# Patient Record
Sex: FEMALE | Race: BLACK OR AFRICAN AMERICAN | NOT HISPANIC OR LATINO
[De-identification: names, ages, dates, MRNs, and addresses within clinical notes are randomized per-mention and may not be internally consistent; named-entity substitution may affect disease eponyms.]

---

## 2017-01-03 ENCOUNTER — MESSAGE (OUTPATIENT)
Age: 37
End: 2017-01-03

## 2017-01-09 ENCOUNTER — APPOINTMENT (OUTPATIENT)
Dept: MATERNAL FETAL MEDICINE | Facility: CLINIC | Age: 37
End: 2017-01-09

## 2017-01-09 ENCOUNTER — ASOB RESULT (OUTPATIENT)
Age: 37
End: 2017-01-09

## 2017-01-09 DIAGNOSIS — O99.810 ABNORMAL GLUCOSE COMPLICATING PREGNANCY: ICD-10-CM

## 2017-01-09 RX ORDER — PEN NEEDLE, DIABETIC 29 G X1/2"
32G X 4 MM NEEDLE, DISPOSABLE MISCELLANEOUS
Qty: 2 | Refills: 5 | Status: ACTIVE | COMMUNITY
Start: 2017-01-09 | End: 1900-01-01

## 2017-01-17 ENCOUNTER — APPOINTMENT (OUTPATIENT)
Dept: ANTEPARTUM | Facility: CLINIC | Age: 37
End: 2017-01-17

## 2017-01-17 ENCOUNTER — APPOINTMENT (OUTPATIENT)
Dept: MATERNAL FETAL MEDICINE | Facility: CLINIC | Age: 37
End: 2017-01-17

## 2017-01-17 ENCOUNTER — ASOB RESULT (OUTPATIENT)
Age: 37
End: 2017-01-17

## 2017-01-27 ENCOUNTER — MESSAGE (OUTPATIENT)
Age: 37
End: 2017-01-27

## 2017-01-31 ENCOUNTER — APPOINTMENT (OUTPATIENT)
Dept: MATERNAL FETAL MEDICINE | Facility: CLINIC | Age: 37
End: 2017-01-31

## 2017-01-31 ENCOUNTER — ASOB RESULT (OUTPATIENT)
Age: 37
End: 2017-01-31

## 2017-01-31 ENCOUNTER — APPOINTMENT (OUTPATIENT)
Dept: ANTEPARTUM | Facility: CLINIC | Age: 37
End: 2017-01-31

## 2017-02-10 ENCOUNTER — MESSAGE (OUTPATIENT)
Age: 37
End: 2017-02-10

## 2017-02-13 ENCOUNTER — ASOB RESULT (OUTPATIENT)
Age: 37
End: 2017-02-13

## 2017-02-13 ENCOUNTER — APPOINTMENT (OUTPATIENT)
Dept: ANTEPARTUM | Facility: CLINIC | Age: 37
End: 2017-02-13

## 2017-02-13 ENCOUNTER — APPOINTMENT (OUTPATIENT)
Dept: MATERNAL FETAL MEDICINE | Facility: CLINIC | Age: 37
End: 2017-02-13

## 2017-02-14 ENCOUNTER — APPOINTMENT (OUTPATIENT)
Dept: ANTEPARTUM | Facility: CLINIC | Age: 37
End: 2017-02-14

## 2017-02-28 ENCOUNTER — APPOINTMENT (OUTPATIENT)
Dept: ANTEPARTUM | Facility: CLINIC | Age: 37
End: 2017-02-28

## 2017-02-28 ENCOUNTER — APPOINTMENT (OUTPATIENT)
Dept: MATERNAL FETAL MEDICINE | Facility: CLINIC | Age: 37
End: 2017-02-28

## 2017-02-28 ENCOUNTER — ASOB RESULT (OUTPATIENT)
Age: 37
End: 2017-02-28

## 2017-03-06 ENCOUNTER — MESSAGE (OUTPATIENT)
Age: 37
End: 2017-03-06

## 2017-03-06 ENCOUNTER — OUTPATIENT (OUTPATIENT)
Dept: OUTPATIENT SERVICES | Age: 37
LOS: 1 days | Discharge: HOME | End: 2017-03-06

## 2017-03-08 ENCOUNTER — APPOINTMENT (OUTPATIENT)
Dept: PEDIATRIC CARDIOLOGY | Facility: CLINIC | Age: 37
End: 2017-03-08

## 2017-03-21 ENCOUNTER — ASOB RESULT (OUTPATIENT)
Age: 37
End: 2017-03-21

## 2017-03-21 ENCOUNTER — APPOINTMENT (OUTPATIENT)
Dept: MATERNAL FETAL MEDICINE | Facility: CLINIC | Age: 37
End: 2017-03-21

## 2017-03-21 ENCOUNTER — APPOINTMENT (OUTPATIENT)
Dept: ANTEPARTUM | Facility: CLINIC | Age: 37
End: 2017-03-21

## 2017-04-11 ENCOUNTER — ASOB RESULT (OUTPATIENT)
Age: 37
End: 2017-04-11

## 2017-04-11 ENCOUNTER — APPOINTMENT (OUTPATIENT)
Dept: ANTEPARTUM | Facility: CLINIC | Age: 37
End: 2017-04-11

## 2017-04-11 ENCOUNTER — APPOINTMENT (OUTPATIENT)
Dept: MATERNAL FETAL MEDICINE | Facility: CLINIC | Age: 37
End: 2017-04-11

## 2017-04-13 ENCOUNTER — OUTPATIENT (OUTPATIENT)
Dept: OUTPATIENT SERVICES | Facility: HOSPITAL | Age: 37
LOS: 1 days | End: 2017-04-13
Payer: COMMERCIAL

## 2017-04-13 DIAGNOSIS — O26.899 OTHER SPECIFIED PREGNANCY RELATED CONDITIONS, UNSPECIFIED TRIMESTER: ICD-10-CM

## 2017-04-13 DIAGNOSIS — Z3A.00 WEEKS OF GESTATION OF PREGNANCY NOT SPECIFIED: ICD-10-CM

## 2017-04-13 LAB
ALBUMIN SERPL ELPH-MCNC: 3.3 G/DL — SIGNIFICANT CHANGE UP (ref 3.3–5)
ALP SERPL-CCNC: 84 U/L — SIGNIFICANT CHANGE UP (ref 40–120)
ALT FLD-CCNC: 25 U/L — SIGNIFICANT CHANGE UP (ref 4–33)
AMYLASE P1 CFR SERPL: 76 U/L — SIGNIFICANT CHANGE UP (ref 25–125)
APPEARANCE UR: CLEAR — SIGNIFICANT CHANGE UP
AST SERPL-CCNC: 17 U/L — SIGNIFICANT CHANGE UP (ref 4–32)
BACTERIA # UR AUTO: SIGNIFICANT CHANGE UP
BASOPHILS # BLD AUTO: 0.01 K/UL — SIGNIFICANT CHANGE UP (ref 0–0.2)
BASOPHILS NFR BLD AUTO: 0.1 % — SIGNIFICANT CHANGE UP (ref 0–2)
BILIRUB SERPL-MCNC: 0.3 MG/DL — SIGNIFICANT CHANGE UP (ref 0.2–1.2)
BILIRUB UR-MCNC: NEGATIVE — SIGNIFICANT CHANGE UP
BLOOD UR QL VISUAL: NEGATIVE — SIGNIFICANT CHANGE UP
BUN SERPL-MCNC: 11 MG/DL — SIGNIFICANT CHANGE UP (ref 7–23)
CALCIUM SERPL-MCNC: 9.1 MG/DL — SIGNIFICANT CHANGE UP (ref 8.4–10.5)
CHLORIDE SERPL-SCNC: 102 MMOL/L — SIGNIFICANT CHANGE UP (ref 98–107)
CO2 SERPL-SCNC: 20 MMOL/L — LOW (ref 22–31)
COLOR SPEC: YELLOW — SIGNIFICANT CHANGE UP
CREAT SERPL-MCNC: 0.57 MG/DL — SIGNIFICANT CHANGE UP (ref 0.5–1.3)
EOSINOPHIL # BLD AUTO: 0.15 K/UL — SIGNIFICANT CHANGE UP (ref 0–0.5)
EOSINOPHIL NFR BLD AUTO: 2 % — SIGNIFICANT CHANGE UP (ref 0–6)
GLUCOSE SERPL-MCNC: 101 MG/DL — HIGH (ref 70–99)
GLUCOSE UR-MCNC: NEGATIVE — SIGNIFICANT CHANGE UP
HCT VFR BLD CALC: 31.4 % — LOW (ref 34.5–45)
HGB BLD-MCNC: 10.2 G/DL — LOW (ref 11.5–15.5)
IMM GRANULOCYTES NFR BLD AUTO: 0.5 % — SIGNIFICANT CHANGE UP (ref 0–1.5)
KETONES UR-MCNC: SIGNIFICANT CHANGE UP
LEUKOCYTE ESTERASE UR-ACNC: NEGATIVE — SIGNIFICANT CHANGE UP
LIDOCAIN IGE QN: 38.4 U/L — SIGNIFICANT CHANGE UP (ref 7–60)
LYMPHOCYTES # BLD AUTO: 0.77 K/UL — LOW (ref 1–3.3)
LYMPHOCYTES # BLD AUTO: 10.1 % — LOW (ref 13–44)
MCHC RBC-ENTMCNC: 26.7 PG — LOW (ref 27–34)
MCHC RBC-ENTMCNC: 32.5 % — SIGNIFICANT CHANGE UP (ref 32–36)
MCV RBC AUTO: 82.2 FL — SIGNIFICANT CHANGE UP (ref 80–100)
MONOCYTES # BLD AUTO: 0.46 K/UL — SIGNIFICANT CHANGE UP (ref 0–0.9)
MONOCYTES NFR BLD AUTO: 6.1 % — SIGNIFICANT CHANGE UP (ref 2–14)
MUCOUS THREADS # UR AUTO: SIGNIFICANT CHANGE UP
NEUTROPHILS # BLD AUTO: 6.16 K/UL — SIGNIFICANT CHANGE UP (ref 1.8–7.4)
NEUTROPHILS NFR BLD AUTO: 81.2 % — HIGH (ref 43–77)
NITRITE UR-MCNC: NEGATIVE — SIGNIFICANT CHANGE UP
PH UR: 6 — SIGNIFICANT CHANGE UP (ref 4.6–8)
PLATELET # BLD AUTO: 234 K/UL — SIGNIFICANT CHANGE UP (ref 150–400)
PMV BLD: 11 FL — SIGNIFICANT CHANGE UP (ref 7–13)
POTASSIUM SERPL-MCNC: 3.8 MMOL/L — SIGNIFICANT CHANGE UP (ref 3.5–5.3)
POTASSIUM SERPL-SCNC: 3.8 MMOL/L — SIGNIFICANT CHANGE UP (ref 3.5–5.3)
PROT SERPL-MCNC: 7.1 G/DL — SIGNIFICANT CHANGE UP (ref 6–8.3)
PROT UR-MCNC: 30 — HIGH
RBC # BLD: 3.82 M/UL — SIGNIFICANT CHANGE UP (ref 3.8–5.2)
RBC # FLD: 14.6 % — HIGH (ref 10.3–14.5)
RBC CASTS # UR COMP ASSIST: SIGNIFICANT CHANGE UP (ref 0–?)
SODIUM SERPL-SCNC: 137 MMOL/L — SIGNIFICANT CHANGE UP (ref 135–145)
SP GR SPEC: 1.03 — SIGNIFICANT CHANGE UP (ref 1–1.03)
SQUAMOUS # UR AUTO: SIGNIFICANT CHANGE UP
UROBILINOGEN FLD QL: NORMAL E.U. — SIGNIFICANT CHANGE UP (ref 0.1–0.2)
WBC # BLD: 7.59 K/UL — SIGNIFICANT CHANGE UP (ref 3.8–10.5)
WBC # FLD AUTO: 7.59 K/UL — SIGNIFICANT CHANGE UP (ref 3.8–10.5)
WBC UR QL: SIGNIFICANT CHANGE UP (ref 0–?)

## 2017-04-13 PROCEDURE — 76818 FETAL BIOPHYS PROFILE W/NST: CPT | Mod: 26

## 2017-04-13 PROCEDURE — 99214 OFFICE O/P EST MOD 30 MIN: CPT | Mod: 25

## 2017-04-13 PROCEDURE — 76817 TRANSVAGINAL US OBSTETRIC: CPT | Mod: 26

## 2017-04-13 RX ORDER — METOCLOPRAMIDE HCL 10 MG
10 TABLET ORAL ONCE
Qty: 0 | Refills: 0 | Status: COMPLETED | OUTPATIENT
Start: 2017-04-13 | End: 2017-04-13

## 2017-04-13 RX ORDER — SODIUM CHLORIDE 9 MG/ML
1000 INJECTION, SOLUTION INTRAVENOUS
Qty: 0 | Refills: 0 | Status: DISCONTINUED | OUTPATIENT
Start: 2017-04-13 | End: 2017-04-28

## 2017-04-13 RX ORDER — SODIUM CHLORIDE 9 MG/ML
1000 INJECTION, SOLUTION INTRAVENOUS ONCE
Qty: 0 | Refills: 0 | Status: COMPLETED | OUTPATIENT
Start: 2017-04-13 | End: 2017-04-13

## 2017-04-13 RX ORDER — METOCLOPRAMIDE HCL 10 MG
1 TABLET ORAL
Qty: 4 | Refills: 0 | OUTPATIENT
Start: 2017-04-13 | End: 2017-04-14

## 2017-04-13 RX ADMIN — Medication 10 MILLIGRAM(S): at 08:27

## 2017-04-13 RX ADMIN — SODIUM CHLORIDE 2000 MILLILITER(S): 9 INJECTION, SOLUTION INTRAVENOUS at 08:27

## 2017-05-01 ENCOUNTER — APPOINTMENT (OUTPATIENT)
Dept: MATERNAL FETAL MEDICINE | Facility: CLINIC | Age: 37
End: 2017-05-01

## 2017-05-01 ENCOUNTER — APPOINTMENT (OUTPATIENT)
Dept: ANTEPARTUM | Facility: CLINIC | Age: 37
End: 2017-05-01

## 2017-05-01 ENCOUNTER — ASOB RESULT (OUTPATIENT)
Age: 37
End: 2017-05-01

## 2017-05-02 ENCOUNTER — MESSAGE (OUTPATIENT)
Age: 37
End: 2017-05-02

## 2017-05-02 RX ORDER — METFORMIN ER 500 MG 500 MG/1
500 TABLET ORAL
Qty: 1 | Refills: 2 | Status: ACTIVE | COMMUNITY
Start: 2017-01-31 | End: 1900-01-01

## 2017-05-15 ENCOUNTER — ASOB RESULT (OUTPATIENT)
Age: 37
End: 2017-05-15

## 2017-05-15 ENCOUNTER — APPOINTMENT (OUTPATIENT)
Dept: ANTEPARTUM | Facility: HOSPITAL | Age: 37
End: 2017-05-15

## 2017-05-15 ENCOUNTER — APPOINTMENT (OUTPATIENT)
Dept: ANTEPARTUM | Facility: CLINIC | Age: 37
End: 2017-05-15

## 2017-05-15 ENCOUNTER — OUTPATIENT (OUTPATIENT)
Dept: OUTPATIENT SERVICES | Facility: HOSPITAL | Age: 37
LOS: 1 days | End: 2017-05-15

## 2017-05-16 DIAGNOSIS — O34.211 MATERNAL CARE FOR LOW TRANSVERSE SCAR FROM PREVIOUS CESAREAN DELIVERY: ICD-10-CM

## 2017-05-16 DIAGNOSIS — O34.29 MATERNAL CARE DUE TO UTERINE SCAR FROM OTHER PREVIOUS SURGERY: ICD-10-CM

## 2017-05-16 DIAGNOSIS — O09.293 SUPERVISION OF PREGNANCY WITH OTHER POOR REPRODUCTIVE OR OBSTETRIC HISTORY, THIRD TRIMESTER: ICD-10-CM

## 2017-05-16 DIAGNOSIS — O99.213 OBESITY COMPLICATING PREGNANCY, THIRD TRIMESTER: ICD-10-CM

## 2017-05-16 DIAGNOSIS — O24.414 GESTATIONAL DIABETES MELLITUS IN PREGNANCY, INSULIN CONTROLLED: ICD-10-CM

## 2017-05-16 DIAGNOSIS — O99.013 ANEMIA COMPLICATING PREGNANCY, THIRD TRIMESTER: ICD-10-CM

## 2017-05-16 DIAGNOSIS — Z3A.32 32 WEEKS GESTATION OF PREGNANCY: ICD-10-CM

## 2017-05-23 ENCOUNTER — OUTPATIENT (OUTPATIENT)
Dept: OUTPATIENT SERVICES | Facility: HOSPITAL | Age: 37
LOS: 1 days | End: 2017-05-23

## 2017-05-23 ENCOUNTER — APPOINTMENT (OUTPATIENT)
Dept: ANTEPARTUM | Facility: HOSPITAL | Age: 37
End: 2017-05-23

## 2017-05-23 ENCOUNTER — APPOINTMENT (OUTPATIENT)
Dept: ANTEPARTUM | Facility: CLINIC | Age: 37
End: 2017-05-23

## 2017-05-23 ENCOUNTER — ASOB RESULT (OUTPATIENT)
Age: 37
End: 2017-05-23

## 2017-05-24 ENCOUNTER — MESSAGE (OUTPATIENT)
Age: 37
End: 2017-05-24

## 2017-05-25 DIAGNOSIS — Z3A.33 33 WEEKS GESTATION OF PREGNANCY: ICD-10-CM

## 2017-05-25 DIAGNOSIS — O99.013 ANEMIA COMPLICATING PREGNANCY, THIRD TRIMESTER: ICD-10-CM

## 2017-05-25 DIAGNOSIS — O09.293 SUPERVISION OF PREGNANCY WITH OTHER POOR REPRODUCTIVE OR OBSTETRIC HISTORY, THIRD TRIMESTER: ICD-10-CM

## 2017-05-25 DIAGNOSIS — O24.414 GESTATIONAL DIABETES MELLITUS IN PREGNANCY, INSULIN CONTROLLED: ICD-10-CM

## 2017-05-25 DIAGNOSIS — O34.211 MATERNAL CARE FOR LOW TRANSVERSE SCAR FROM PREVIOUS CESAREAN DELIVERY: ICD-10-CM

## 2017-05-25 DIAGNOSIS — O34.29 MATERNAL CARE DUE TO UTERINE SCAR FROM OTHER PREVIOUS SURGERY: ICD-10-CM

## 2017-05-25 DIAGNOSIS — O99.213 OBESITY COMPLICATING PREGNANCY, THIRD TRIMESTER: ICD-10-CM

## 2017-05-25 RX ORDER — INSULIN DETEMIR 100 [IU]/ML
100 INJECTION, SOLUTION SUBCUTANEOUS
Qty: 2 | Refills: 5 | Status: ACTIVE | COMMUNITY
Start: 2017-01-09 | End: 1900-01-01

## 2017-05-26 ENCOUNTER — MESSAGE (OUTPATIENT)
Age: 37
End: 2017-05-26

## 2017-05-30 ENCOUNTER — APPOINTMENT (OUTPATIENT)
Dept: ANTEPARTUM | Facility: CLINIC | Age: 37
End: 2017-05-30

## 2017-05-30 ENCOUNTER — ASOB RESULT (OUTPATIENT)
Age: 37
End: 2017-05-30

## 2017-05-30 ENCOUNTER — APPOINTMENT (OUTPATIENT)
Dept: ANTEPARTUM | Facility: HOSPITAL | Age: 37
End: 2017-05-30

## 2017-05-30 ENCOUNTER — OUTPATIENT (OUTPATIENT)
Dept: OUTPATIENT SERVICES | Facility: HOSPITAL | Age: 37
LOS: 1 days | End: 2017-05-30

## 2017-05-30 ENCOUNTER — APPOINTMENT (OUTPATIENT)
Dept: MATERNAL FETAL MEDICINE | Facility: CLINIC | Age: 37
End: 2017-05-30

## 2017-05-31 DIAGNOSIS — O99.013 ANEMIA COMPLICATING PREGNANCY, THIRD TRIMESTER: ICD-10-CM

## 2017-05-31 DIAGNOSIS — O09.293 SUPERVISION OF PREGNANCY WITH OTHER POOR REPRODUCTIVE OR OBSTETRIC HISTORY, THIRD TRIMESTER: ICD-10-CM

## 2017-05-31 DIAGNOSIS — O24.414 GESTATIONAL DIABETES MELLITUS IN PREGNANCY, INSULIN CONTROLLED: ICD-10-CM

## 2017-05-31 DIAGNOSIS — O34.29 MATERNAL CARE DUE TO UTERINE SCAR FROM OTHER PREVIOUS SURGERY: ICD-10-CM

## 2017-06-06 ENCOUNTER — OUTPATIENT (OUTPATIENT)
Dept: OUTPATIENT SERVICES | Facility: HOSPITAL | Age: 37
LOS: 1 days | End: 2017-06-06

## 2017-06-06 ENCOUNTER — APPOINTMENT (OUTPATIENT)
Dept: ANTEPARTUM | Facility: HOSPITAL | Age: 37
End: 2017-06-06

## 2017-06-06 ENCOUNTER — ASOB RESULT (OUTPATIENT)
Age: 37
End: 2017-06-06

## 2017-06-06 ENCOUNTER — APPOINTMENT (OUTPATIENT)
Dept: ANTEPARTUM | Facility: CLINIC | Age: 37
End: 2017-06-06

## 2017-06-07 ENCOUNTER — OUTPATIENT (OUTPATIENT)
Dept: OUTPATIENT SERVICES | Facility: HOSPITAL | Age: 37
LOS: 1 days | End: 2017-06-07

## 2017-06-07 DIAGNOSIS — O34.29 MATERNAL CARE DUE TO UTERINE SCAR FROM OTHER PREVIOUS SURGERY: ICD-10-CM

## 2017-06-07 DIAGNOSIS — Z64.1 PROBLEMS RELATED TO MULTIPARITY: ICD-10-CM

## 2017-06-07 DIAGNOSIS — O34.211 MATERNAL CARE FOR LOW TRANSVERSE SCAR FROM PREVIOUS CESAREAN DELIVERY: ICD-10-CM

## 2017-06-07 DIAGNOSIS — Z3A.35 35 WEEKS GESTATION OF PREGNANCY: ICD-10-CM

## 2017-06-07 DIAGNOSIS — O09.293 SUPERVISION OF PREGNANCY WITH OTHER POOR REPRODUCTIVE OR OBSTETRIC HISTORY, THIRD TRIMESTER: ICD-10-CM

## 2017-06-07 DIAGNOSIS — O24.414 GESTATIONAL DIABETES MELLITUS IN PREGNANCY, INSULIN CONTROLLED: ICD-10-CM

## 2017-06-07 DIAGNOSIS — O99.213 OBESITY COMPLICATING PREGNANCY, THIRD TRIMESTER: ICD-10-CM

## 2017-06-07 DIAGNOSIS — O99.013 ANEMIA COMPLICATING PREGNANCY, THIRD TRIMESTER: ICD-10-CM

## 2017-06-07 LAB
APPEARANCE UR: CLEAR — SIGNIFICANT CHANGE UP
BACTERIA # UR AUTO: SIGNIFICANT CHANGE UP
BILIRUB UR-MCNC: NEGATIVE — SIGNIFICANT CHANGE UP
BLD GP AB SCN SERPL QL: NEGATIVE — SIGNIFICANT CHANGE UP
BLOOD UR QL VISUAL: NEGATIVE — SIGNIFICANT CHANGE UP
BUN SERPL-MCNC: 10 MG/DL — SIGNIFICANT CHANGE UP (ref 7–23)
CALCIUM SERPL-MCNC: 8.7 MG/DL — SIGNIFICANT CHANGE UP (ref 8.4–10.5)
CHLORIDE SERPL-SCNC: 103 MMOL/L — SIGNIFICANT CHANGE UP (ref 98–107)
CO2 SERPL-SCNC: 18 MMOL/L — LOW (ref 22–31)
COLOR SPEC: YELLOW — SIGNIFICANT CHANGE UP
CREAT SERPL-MCNC: 0.62 MG/DL — SIGNIFICANT CHANGE UP (ref 0.5–1.3)
GLUCOSE SERPL-MCNC: 110 MG/DL — HIGH (ref 70–99)
GLUCOSE UR-MCNC: NEGATIVE — SIGNIFICANT CHANGE UP
HCT VFR BLD CALC: 28.9 % — LOW (ref 34.5–45)
HGB BLD-MCNC: 9 G/DL — LOW (ref 11.5–15.5)
KETONES UR-MCNC: SIGNIFICANT CHANGE UP
LEUKOCYTE ESTERASE UR-ACNC: NEGATIVE — SIGNIFICANT CHANGE UP
MCHC RBC-ENTMCNC: 24.6 PG — LOW (ref 27–34)
MCHC RBC-ENTMCNC: 31.1 % — LOW (ref 32–36)
MCV RBC AUTO: 79 FL — LOW (ref 80–100)
MUCOUS THREADS # UR AUTO: SIGNIFICANT CHANGE UP
NITRITE UR-MCNC: NEGATIVE — SIGNIFICANT CHANGE UP
NON-SQ EPI CELLS # UR AUTO: <1 — SIGNIFICANT CHANGE UP
PH UR: 6.5 — SIGNIFICANT CHANGE UP (ref 4.6–8)
PLATELET # BLD AUTO: 219 K/UL — SIGNIFICANT CHANGE UP (ref 150–400)
PMV BLD: 11 FL — SIGNIFICANT CHANGE UP (ref 7–13)
POTASSIUM SERPL-MCNC: 3.5 MMOL/L — SIGNIFICANT CHANGE UP (ref 3.5–5.3)
POTASSIUM SERPL-SCNC: 3.5 MMOL/L — SIGNIFICANT CHANGE UP (ref 3.5–5.3)
PROT UR-MCNC: 20 — SIGNIFICANT CHANGE UP
RBC # BLD: 3.66 M/UL — LOW (ref 3.8–5.2)
RBC # FLD: 15.7 % — HIGH (ref 10.3–14.5)
RBC CASTS # UR COMP ASSIST: SIGNIFICANT CHANGE UP (ref 0–?)
RH IG SCN BLD-IMP: POSITIVE — SIGNIFICANT CHANGE UP
SODIUM SERPL-SCNC: 138 MMOL/L — SIGNIFICANT CHANGE UP (ref 135–145)
SP GR SPEC: 1.02 — SIGNIFICANT CHANGE UP (ref 1–1.03)
SQUAMOUS # UR AUTO: SIGNIFICANT CHANGE UP
UROBILINOGEN FLD QL: NORMAL E.U. — SIGNIFICANT CHANGE UP (ref 0.1–0.2)
WBC # BLD: 6.79 K/UL — SIGNIFICANT CHANGE UP (ref 3.8–10.5)
WBC # FLD AUTO: 6.79 K/UL — SIGNIFICANT CHANGE UP (ref 3.8–10.5)
WBC UR QL: SIGNIFICANT CHANGE UP (ref 0–?)

## 2017-06-08 LAB — T PALLIDUM AB TITR SER: NEGATIVE — SIGNIFICANT CHANGE UP

## 2017-06-13 ENCOUNTER — ASOB RESULT (OUTPATIENT)
Age: 37
End: 2017-06-13

## 2017-06-13 ENCOUNTER — APPOINTMENT (OUTPATIENT)
Dept: ANTEPARTUM | Facility: CLINIC | Age: 37
End: 2017-06-13

## 2017-06-13 ENCOUNTER — APPOINTMENT (OUTPATIENT)
Dept: MATERNAL FETAL MEDICINE | Facility: CLINIC | Age: 37
End: 2017-06-13

## 2017-06-13 ENCOUNTER — OUTPATIENT (OUTPATIENT)
Dept: OUTPATIENT SERVICES | Facility: HOSPITAL | Age: 37
LOS: 1 days | End: 2017-06-13

## 2017-06-16 ENCOUNTER — APPOINTMENT (OUTPATIENT)
Dept: ANTEPARTUM | Facility: HOSPITAL | Age: 37
End: 2017-06-16

## 2017-06-20 ENCOUNTER — TRANSCRIPTION ENCOUNTER (OUTPATIENT)
Age: 37
End: 2017-06-20

## 2017-06-20 ENCOUNTER — RESULT REVIEW (OUTPATIENT)
Age: 37
End: 2017-06-20

## 2017-06-20 ENCOUNTER — INPATIENT (INPATIENT)
Facility: HOSPITAL | Age: 37
LOS: 2 days | Discharge: HOME | End: 2017-06-23
Attending: OBSTETRICS & GYNECOLOGY | Admitting: OBSTETRICS & GYNECOLOGY
Payer: COMMERCIAL

## 2017-06-20 VITALS — HEIGHT: 68 IN | WEIGHT: 293 LBS

## 2017-06-20 DIAGNOSIS — Z64.1 PROBLEMS RELATED TO MULTIPARITY: ICD-10-CM

## 2017-06-20 LAB
BASE EXCESS BLDV CALC-SCNC: -3.8 MMOL/L — SIGNIFICANT CHANGE UP
BASOPHILS # BLD AUTO: 0.02 K/UL — SIGNIFICANT CHANGE UP (ref 0–0.2)
BASOPHILS NFR BLD AUTO: 0.2 % — SIGNIFICANT CHANGE UP (ref 0–2)
BLD GP AB SCN SERPL QL: NEGATIVE — SIGNIFICANT CHANGE UP
EOSINOPHIL # BLD AUTO: 0.01 K/UL — SIGNIFICANT CHANGE UP (ref 0–0.5)
EOSINOPHIL NFR BLD AUTO: 0.1 % — SIGNIFICANT CHANGE UP (ref 0–6)
GAS PNL BLDV: 137 MMOL/L — SIGNIFICANT CHANGE UP (ref 136–146)
GLUCOSE BLDV-MCNC: 88 — SIGNIFICANT CHANGE UP (ref 70–99)
HCO3 BLDV-SCNC: 21 MMOL/L — SIGNIFICANT CHANGE UP (ref 20–27)
HCT VFR BLD CALC: 31.7 % — LOW (ref 34.5–45)
HCT VFR BLDV CALC: 27.5 % — LOW (ref 34.5–45)
HGB BLD-MCNC: 10.3 G/DL — LOW (ref 11.5–15.5)
HGB BLDV-MCNC: 8.9 G/DL — LOW (ref 11.5–15.5)
IMM GRANULOCYTES NFR BLD AUTO: 0.5 % — SIGNIFICANT CHANGE UP (ref 0–1.5)
LYMPHOCYTES # BLD AUTO: 1.14 K/UL — SIGNIFICANT CHANGE UP (ref 1–3.3)
LYMPHOCYTES # BLD AUTO: 10.4 % — LOW (ref 13–44)
MCHC RBC-ENTMCNC: 25.8 PG — LOW (ref 27–34)
MCHC RBC-ENTMCNC: 32.5 % — SIGNIFICANT CHANGE UP (ref 32–36)
MCV RBC AUTO: 79.4 FL — LOW (ref 80–100)
MONOCYTES # BLD AUTO: 0.77 K/UL — SIGNIFICANT CHANGE UP (ref 0–0.9)
MONOCYTES NFR BLD AUTO: 7 % — SIGNIFICANT CHANGE UP (ref 2–14)
NEUTROPHILS # BLD AUTO: 8.95 K/UL — HIGH (ref 1.8–7.4)
NEUTROPHILS NFR BLD AUTO: 81.8 % — HIGH (ref 43–77)
PCO2 BLDV: 36 MMHG — LOW (ref 41–51)
PH BLDV: 7.38 PH — SIGNIFICANT CHANGE UP (ref 7.32–7.43)
PLATELET # BLD AUTO: 182 K/UL — SIGNIFICANT CHANGE UP (ref 150–400)
PMV BLD: 10.7 FL — SIGNIFICANT CHANGE UP (ref 7–13)
PO2 BLDV: 72 MMHG — HIGH (ref 35–40)
POTASSIUM BLDV-SCNC: 3.5 MMOL/L — SIGNIFICANT CHANGE UP (ref 3.4–4.5)
RBC # BLD: 3.99 M/UL — SIGNIFICANT CHANGE UP (ref 3.8–5.2)
RBC # FLD: 16.1 % — HIGH (ref 10.3–14.5)
RH IG SCN BLD-IMP: POSITIVE — SIGNIFICANT CHANGE UP
SAO2 % BLDV: 93.6 % — HIGH (ref 60–85)
WBC # BLD: 10.94 K/UL — HIGH (ref 3.8–10.5)
WBC # FLD AUTO: 10.94 K/UL — HIGH (ref 3.8–10.5)

## 2017-06-20 PROCEDURE — 88302 TISSUE EXAM BY PATHOLOGIST: CPT | Mod: 26

## 2017-06-20 RX ORDER — SODIUM CHLORIDE 9 MG/ML
1000 INJECTION, SOLUTION INTRAVENOUS
Qty: 0 | Refills: 0 | Status: DISCONTINUED | OUTPATIENT
Start: 2017-06-20 | End: 2017-06-21

## 2017-06-20 RX ORDER — LANOLIN
1 OINTMENT (GRAM) TOPICAL
Qty: 0 | Refills: 0 | Status: DISCONTINUED | OUTPATIENT
Start: 2017-06-20 | End: 2017-06-23

## 2017-06-20 RX ORDER — GLYCERIN ADULT
1 SUPPOSITORY, RECTAL RECTAL AT BEDTIME
Qty: 0 | Refills: 0 | Status: DISCONTINUED | OUTPATIENT
Start: 2017-06-20 | End: 2017-06-23

## 2017-06-20 RX ORDER — DOCUSATE SODIUM 100 MG
100 CAPSULE ORAL
Qty: 0 | Refills: 0 | Status: DISCONTINUED | OUTPATIENT
Start: 2017-06-20 | End: 2017-06-21

## 2017-06-20 RX ORDER — FERROUS SULFATE 325(65) MG
325 TABLET ORAL DAILY
Qty: 0 | Refills: 0 | Status: DISCONTINUED | OUTPATIENT
Start: 2017-06-20 | End: 2017-06-21

## 2017-06-20 RX ORDER — ACETAMINOPHEN 500 MG
650 TABLET ORAL EVERY 6 HOURS
Qty: 0 | Refills: 0 | Status: DISCONTINUED | OUTPATIENT
Start: 2017-06-20 | End: 2017-06-21

## 2017-06-20 RX ORDER — METOCLOPRAMIDE HCL 10 MG
10 TABLET ORAL ONCE
Qty: 0 | Refills: 0 | Status: COMPLETED | OUTPATIENT
Start: 2017-06-20 | End: 2017-06-20

## 2017-06-20 RX ORDER — HYDROMORPHONE HYDROCHLORIDE 2 MG/ML
1 INJECTION INTRAMUSCULAR; INTRAVENOUS; SUBCUTANEOUS
Qty: 0 | Refills: 0 | Status: DISCONTINUED | OUTPATIENT
Start: 2017-06-21 | End: 2017-06-21

## 2017-06-20 RX ORDER — OXYTOCIN 10 UNIT/ML
41.67 VIAL (ML) INJECTION
Qty: 20 | Refills: 0 | Status: DISCONTINUED | OUTPATIENT
Start: 2017-06-20 | End: 2017-06-21

## 2017-06-20 RX ORDER — IBUPROFEN 200 MG
600 TABLET ORAL EVERY 6 HOURS
Qty: 0 | Refills: 0 | Status: DISCONTINUED | OUTPATIENT
Start: 2017-06-20 | End: 2017-06-23

## 2017-06-20 RX ORDER — OXYCODONE HYDROCHLORIDE 5 MG/1
10 TABLET ORAL
Qty: 0 | Refills: 0 | Status: DISCONTINUED | OUTPATIENT
Start: 2017-06-21 | End: 2017-06-21

## 2017-06-20 RX ORDER — SIMETHICONE 80 MG/1
80 TABLET, CHEWABLE ORAL EVERY 4 HOURS
Qty: 0 | Refills: 0 | Status: DISCONTINUED | OUTPATIENT
Start: 2017-06-20 | End: 2017-06-23

## 2017-06-20 RX ORDER — CITRIC ACID/SODIUM CITRATE 300-500 MG
30 SOLUTION, ORAL ORAL ONCE
Qty: 0 | Refills: 0 | Status: COMPLETED | OUTPATIENT
Start: 2017-06-20 | End: 2017-06-20

## 2017-06-20 RX ORDER — DIPHENHYDRAMINE HCL 50 MG
25 CAPSULE ORAL EVERY 6 HOURS
Qty: 0 | Refills: 0 | Status: DISCONTINUED | OUTPATIENT
Start: 2017-06-20 | End: 2017-06-23

## 2017-06-20 RX ORDER — OXYCODONE HYDROCHLORIDE 5 MG/1
5 TABLET ORAL
Qty: 0 | Refills: 0 | Status: DISCONTINUED | OUTPATIENT
Start: 2017-06-20 | End: 2017-06-21

## 2017-06-20 RX ORDER — SODIUM CHLORIDE 9 MG/ML
1000 INJECTION, SOLUTION INTRAVENOUS
Qty: 0 | Refills: 0 | Status: DISCONTINUED | OUTPATIENT
Start: 2017-06-20 | End: 2017-06-20

## 2017-06-20 RX ORDER — TETANUS TOXOID, REDUCED DIPHTHERIA TOXOID AND ACELLULAR PERTUSSIS VACCINE, ADSORBED 5; 2.5; 8; 8; 2.5 [IU]/.5ML; [IU]/.5ML; UG/.5ML; UG/.5ML; UG/.5ML
0.5 SUSPENSION INTRAMUSCULAR ONCE
Qty: 0 | Refills: 0 | Status: DISCONTINUED | OUTPATIENT
Start: 2017-06-21 | End: 2017-06-23

## 2017-06-20 RX ORDER — CEFAZOLIN SODIUM 1 G
3000 VIAL (EA) INJECTION ONCE
Qty: 0 | Refills: 0 | Status: COMPLETED | OUTPATIENT
Start: 2017-06-20 | End: 2017-06-20

## 2017-06-20 RX ORDER — BUTORPHANOL TARTRATE 2 MG/ML
0.12 INJECTION, SOLUTION INTRAMUSCULAR; INTRAVENOUS EVERY 6 HOURS
Qty: 0 | Refills: 0 | Status: DISCONTINUED | OUTPATIENT
Start: 2017-06-21 | End: 2017-06-21

## 2017-06-20 RX ORDER — ACETAMINOPHEN 500 MG
975 TABLET ORAL EVERY 6 HOURS
Qty: 0 | Refills: 0 | Status: DISCONTINUED | OUTPATIENT
Start: 2017-06-21 | End: 2017-06-23

## 2017-06-20 RX ORDER — ONDANSETRON 8 MG/1
4 TABLET, FILM COATED ORAL EVERY 6 HOURS
Qty: 0 | Refills: 0 | Status: DISCONTINUED | OUTPATIENT
Start: 2017-06-20 | End: 2017-06-21

## 2017-06-20 RX ORDER — HEPARIN SODIUM 5000 [USP'U]/ML
10000 INJECTION INTRAVENOUS; SUBCUTANEOUS EVERY 12 HOURS
Qty: 0 | Refills: 0 | Status: DISCONTINUED | OUTPATIENT
Start: 2017-06-20 | End: 2017-06-23

## 2017-06-20 RX ORDER — KETOROLAC TROMETHAMINE 30 MG/ML
30 SYRINGE (ML) INJECTION EVERY 6 HOURS
Qty: 0 | Refills: 0 | Status: DISCONTINUED | OUTPATIENT
Start: 2017-06-20 | End: 2017-06-20

## 2017-06-20 RX ORDER — FAMOTIDINE 10 MG/ML
20 INJECTION INTRAVENOUS ONCE
Qty: 0 | Refills: 0 | Status: COMPLETED | OUTPATIENT
Start: 2017-06-20 | End: 2017-06-20

## 2017-06-20 RX ORDER — SODIUM CHLORIDE 9 MG/ML
1000 INJECTION, SOLUTION INTRAVENOUS ONCE
Qty: 0 | Refills: 0 | Status: COMPLETED | OUTPATIENT
Start: 2017-06-20 | End: 2017-06-20

## 2017-06-20 RX ORDER — HYDROMORPHONE HYDROCHLORIDE 2 MG/ML
1 INJECTION INTRAMUSCULAR; INTRAVENOUS; SUBCUTANEOUS
Qty: 0 | Refills: 0 | Status: DISCONTINUED | OUTPATIENT
Start: 2017-06-20 | End: 2017-06-20

## 2017-06-20 RX ADMIN — OXYCODONE HYDROCHLORIDE 5 MILLIGRAM(S): 5 TABLET ORAL at 23:15

## 2017-06-20 RX ADMIN — SIMETHICONE 80 MILLIGRAM(S): 80 TABLET, CHEWABLE ORAL at 22:42

## 2017-06-20 RX ADMIN — OXYCODONE HYDROCHLORIDE 5 MILLIGRAM(S): 5 TABLET ORAL at 22:42

## 2017-06-20 RX ADMIN — HEPARIN SODIUM 10000 UNIT(S): 5000 INJECTION INTRAVENOUS; SUBCUTANEOUS at 15:30

## 2017-06-20 RX ADMIN — Medication 200 MILLIGRAM(S): at 19:34

## 2017-06-20 RX ADMIN — ONDANSETRON 4 MILLIGRAM(S): 8 TABLET, FILM COATED ORAL at 12:14

## 2017-06-20 RX ADMIN — SODIUM CHLORIDE 2000 MILLILITER(S): 9 INJECTION, SOLUTION INTRAVENOUS at 07:35

## 2017-06-20 RX ADMIN — SODIUM CHLORIDE 125 MILLILITER(S): 9 INJECTION, SOLUTION INTRAVENOUS at 07:36

## 2017-06-20 RX ADMIN — FAMOTIDINE 20 MILLIGRAM(S): 10 INJECTION INTRAVENOUS at 07:00

## 2017-06-20 RX ADMIN — Medication 650 MILLIGRAM(S): at 22:42

## 2017-06-20 RX ADMIN — HYDROMORPHONE HYDROCHLORIDE 1 MILLIGRAM(S): 2 INJECTION INTRAMUSCULAR; INTRAVENOUS; SUBCUTANEOUS at 12:37

## 2017-06-20 RX ADMIN — Medication 30 MILLILITER(S): at 07:00

## 2017-06-20 RX ADMIN — Medication 10 MILLIGRAM(S): at 13:39

## 2017-06-20 RX ADMIN — SODIUM CHLORIDE 2000 MILLILITER(S): 9 INJECTION, SOLUTION INTRAVENOUS at 14:20

## 2017-06-20 RX ADMIN — Medication 10 MILLIGRAM(S): at 07:35

## 2017-06-20 RX ADMIN — HYDROMORPHONE HYDROCHLORIDE 1 MILLIGRAM(S): 2 INJECTION INTRAMUSCULAR; INTRAVENOUS; SUBCUTANEOUS at 11:33

## 2017-06-20 RX ADMIN — Medication 975 MILLIGRAM(S): at 21:55

## 2017-06-20 RX ADMIN — Medication 30 MILLIGRAM(S): at 16:00

## 2017-06-20 RX ADMIN — HYDROMORPHONE HYDROCHLORIDE 1 MILLIGRAM(S): 2 INJECTION INTRAMUSCULAR; INTRAVENOUS; SUBCUTANEOUS at 12:14

## 2017-06-20 RX ADMIN — Medication 125 MILLIUNIT(S)/MIN: at 12:03

## 2017-06-20 RX ADMIN — SODIUM CHLORIDE 75 MILLILITER(S): 9 INJECTION, SOLUTION INTRAVENOUS at 13:11

## 2017-06-20 RX ADMIN — HYDROMORPHONE HYDROCHLORIDE 1 MILLIGRAM(S): 2 INJECTION INTRAMUSCULAR; INTRAVENOUS; SUBCUTANEOUS at 11:45

## 2017-06-20 NOTE — PROGRESS NOTE ADULT - SUBJECTIVE AND OBJECTIVE BOX
R2 OB Event Note    Patient evaluated due to low UOP post-operatively.  Patient with no complaints.  Denies HA, lightheadness, dizziness, CP/SOB, palpiations.   Pt evaluated w/Dr. Boswell at bedside. UOP for the hour was 50 which was adequate.    T(C): 36.5, Max: 36.6 (06-20 @ 16:00)  HR: 93 (73 - 110)  BP: 129/76 (109/65 - 147/83)  RR: 18 (14 - 27)  SpO2: 97% (95% - 100%)  Wt(kg): --  I&O's Summary    I & Os for current day (as of 20 Jun 2017 18:44)  =============================================  IN: 3850 ml / OUT: 270 ml / NET: 3580 ml    I&O's Detail    I & Os for current day (as of 20 Jun 2017 18:44)  =============================================  IN:    Lactated Ringers IV Bolus: 3000 ml    oxytocin Infusion: 625 ml    lactated ringers.: 225 ml    Total IN: 3850 ml  ---------------------------------------------  OUT:    Indwelling Catheter - Urethral: 270 ml    Total OUT: 270 ml  ---------------------------------------------  Total NET: 3580 ml      General: patient lying comftorably in bed, NAD   CV: RR S1S2   Lungs: CTA b/l, good air flow b/l   Abd: fundus firm, non-tender, incision c/d/i   Vaginal: mild lochia on pad   Extremeties: non-tender b/l, no edema      A/P 36y POD#0 s/p    LTCS   w/ low UOP post-operatively.    Differential includes intra-abdominal bleeding vs. intravascular depletion.  Low concern for intra-abdominal bleeding given normal vitals, scan and benign exam.     -Increase maintanence to  200  -Continue to monitor VS and UOP  -Patinet to remain in PACU until UOP is adequate    ZANDER Villalba, PGY2

## 2017-06-21 LAB
HCT VFR BLD CALC: 27 % — LOW (ref 34.5–45)
HCT VFR BLD CALC: 27.1 % — LOW (ref 34.5–45)
HGB BLD-MCNC: 8.6 G/DL — LOW (ref 11.5–15.5)
HGB BLD-MCNC: 8.9 G/DL — LOW (ref 11.5–15.5)
MCHC RBC-ENTMCNC: 24.9 PG — LOW (ref 27–34)
MCHC RBC-ENTMCNC: 26 PG — LOW (ref 27–34)
MCHC RBC-ENTMCNC: 31.7 % — LOW (ref 32–36)
MCHC RBC-ENTMCNC: 33 % — SIGNIFICANT CHANGE UP (ref 32–36)
MCV RBC AUTO: 78.6 FL — LOW (ref 80–100)
MCV RBC AUTO: 78.9 FL — LOW (ref 80–100)
PLATELET # BLD AUTO: 201 K/UL — SIGNIFICANT CHANGE UP (ref 150–400)
PLATELET # BLD AUTO: 218 K/UL — SIGNIFICANT CHANGE UP (ref 150–400)
PMV BLD: 11.3 FL — SIGNIFICANT CHANGE UP (ref 7–13)
PMV BLD: 11.3 FL — SIGNIFICANT CHANGE UP (ref 7–13)
RBC # BLD: 3.42 M/UL — LOW (ref 3.8–5.2)
RBC # BLD: 3.45 M/UL — LOW (ref 3.8–5.2)
RBC # FLD: 16.3 % — HIGH (ref 10.3–14.5)
RBC # FLD: 16.4 % — HIGH (ref 10.3–14.5)
WBC # BLD: 10.27 K/UL — SIGNIFICANT CHANGE UP (ref 3.8–10.5)
WBC # BLD: 9.67 K/UL — SIGNIFICANT CHANGE UP (ref 3.8–10.5)
WBC # FLD AUTO: 10.27 K/UL — SIGNIFICANT CHANGE UP (ref 3.8–10.5)
WBC # FLD AUTO: 9.67 K/UL — SIGNIFICANT CHANGE UP (ref 3.8–10.5)

## 2017-06-21 RX ORDER — OXYCODONE HYDROCHLORIDE 5 MG/1
5 TABLET ORAL EVERY 4 HOURS
Qty: 0 | Refills: 0 | Status: DISCONTINUED | OUTPATIENT
Start: 2017-06-21 | End: 2017-06-23

## 2017-06-21 RX ORDER — KETOROLAC TROMETHAMINE 30 MG/ML
30 SYRINGE (ML) INJECTION EVERY 6 HOURS
Qty: 0 | Refills: 0 | Status: DISCONTINUED | OUTPATIENT
Start: 2017-06-21 | End: 2017-06-21

## 2017-06-21 RX ORDER — DOCUSATE SODIUM 100 MG
100 CAPSULE ORAL
Qty: 0 | Refills: 0 | Status: DISCONTINUED | OUTPATIENT
Start: 2017-06-21 | End: 2017-06-23

## 2017-06-21 RX ORDER — ASCORBIC ACID 60 MG
500 TABLET,CHEWABLE ORAL DAILY
Qty: 0 | Refills: 0 | Status: DISCONTINUED | OUTPATIENT
Start: 2017-06-21 | End: 2017-06-23

## 2017-06-21 RX ORDER — OXYCODONE HYDROCHLORIDE 5 MG/1
5 TABLET ORAL
Qty: 0 | Refills: 0 | Status: DISCONTINUED | OUTPATIENT
Start: 2017-06-21 | End: 2017-06-23

## 2017-06-21 RX ORDER — FERROUS SULFATE 325(65) MG
325 TABLET ORAL
Qty: 0 | Refills: 0 | Status: DISCONTINUED | OUTPATIENT
Start: 2017-06-21 | End: 2017-06-23

## 2017-06-21 RX ADMIN — Medication 975 MILLIGRAM(S): at 10:20

## 2017-06-21 RX ADMIN — Medication 30 MILLIGRAM(S): at 10:10

## 2017-06-21 RX ADMIN — Medication 325 MILLIGRAM(S): at 18:10

## 2017-06-21 RX ADMIN — Medication 325 MILLIGRAM(S): at 09:51

## 2017-06-21 RX ADMIN — OXYCODONE HYDROCHLORIDE 5 MILLIGRAM(S): 5 TABLET ORAL at 18:30

## 2017-06-21 RX ADMIN — Medication 30 MILLIGRAM(S): at 09:49

## 2017-06-21 RX ADMIN — OXYCODONE HYDROCHLORIDE 5 MILLIGRAM(S): 5 TABLET ORAL at 23:12

## 2017-06-21 RX ADMIN — OXYCODONE HYDROCHLORIDE 5 MILLIGRAM(S): 5 TABLET ORAL at 14:36

## 2017-06-21 RX ADMIN — SIMETHICONE 80 MILLIGRAM(S): 80 TABLET, CHEWABLE ORAL at 09:50

## 2017-06-21 RX ADMIN — HEPARIN SODIUM 10000 UNIT(S): 5000 INJECTION INTRAVENOUS; SUBCUTANEOUS at 04:13

## 2017-06-21 RX ADMIN — OXYCODONE HYDROCHLORIDE 5 MILLIGRAM(S): 5 TABLET ORAL at 22:12

## 2017-06-21 RX ADMIN — Medication 975 MILLIGRAM(S): at 05:05

## 2017-06-21 RX ADMIN — Medication 1 TABLET(S): at 09:51

## 2017-06-21 RX ADMIN — OXYCODONE HYDROCHLORIDE 5 MILLIGRAM(S): 5 TABLET ORAL at 18:10

## 2017-06-21 RX ADMIN — OXYCODONE HYDROCHLORIDE 5 MILLIGRAM(S): 5 TABLET ORAL at 14:06

## 2017-06-21 RX ADMIN — OXYCODONE HYDROCHLORIDE 5 MILLIGRAM(S): 5 TABLET ORAL at 04:31

## 2017-06-21 RX ADMIN — Medication 600 MILLIGRAM(S): at 16:00

## 2017-06-21 RX ADMIN — Medication 600 MILLIGRAM(S): at 15:25

## 2017-06-21 RX ADMIN — Medication 975 MILLIGRAM(S): at 20:55

## 2017-06-21 RX ADMIN — Medication 975 MILLIGRAM(S): at 15:27

## 2017-06-21 RX ADMIN — Medication 975 MILLIGRAM(S): at 04:31

## 2017-06-21 RX ADMIN — Medication 975 MILLIGRAM(S): at 16:00

## 2017-06-21 RX ADMIN — Medication 600 MILLIGRAM(S): at 20:55

## 2017-06-21 RX ADMIN — SIMETHICONE 80 MILLIGRAM(S): 80 TABLET, CHEWABLE ORAL at 20:55

## 2017-06-21 RX ADMIN — Medication 975 MILLIGRAM(S): at 09:50

## 2017-06-21 RX ADMIN — SIMETHICONE 80 MILLIGRAM(S): 80 TABLET, CHEWABLE ORAL at 15:35

## 2017-06-21 RX ADMIN — Medication 100 MILLIGRAM(S): at 09:51

## 2017-06-21 RX ADMIN — Medication 600 MILLIGRAM(S): at 21:55

## 2017-06-21 RX ADMIN — OXYCODONE HYDROCHLORIDE 5 MILLIGRAM(S): 5 TABLET ORAL at 05:05

## 2017-06-21 RX ADMIN — SIMETHICONE 80 MILLIGRAM(S): 80 TABLET, CHEWABLE ORAL at 04:31

## 2017-06-21 RX ADMIN — HEPARIN SODIUM 10000 UNIT(S): 5000 INJECTION INTRAVENOUS; SUBCUTANEOUS at 15:25

## 2017-06-21 NOTE — PROGRESS NOTE ADULT - SUBJECTIVE AND OBJECTIVE BOX
Patient seen and examined at bedside, no acute overnight events. No acute complaints, pain well controlled.  Patient is ambulating and tolerating regular diet. Has passed flatus. García is still in place. Pt denies light headedness, SOB, palpitations.    Vital Signs Last 24 Hours  T(C): 37.1, Max: 37.1 (06-21 @ 01:37)  HR: 99 (73 - 110)  BP: 113/53 (109/65 - 147/83)  RR: 18 (14 - 27)  SpO2: 95% (95% - 100%)  Wt(kg): --    I&O's Summary    I & Os for current day (as of 21 Jun 2017 07:36)  =============================================  IN: 3850 ml / OUT: 1720 ml / NET: 2130 ml      Physical Exam:  General: NAD  Abdomen: Soft, non-tender, non-distended, fundus firm  Incision: Pfannenstiel incision CDI, subcuticular suture closure  Pelvic: Lochia wnl    Labs:    Blood Type: A Positive  RPR: Negative               10.3   10.94 )-----------( 182      ( 06-20 @ 15:38 )             31.7         MEDICATIONS  (STANDING):  acetaminophen   Tablet. 975milliGRAM(s) Oral every 6 hours  lactated ringers. 1000milliLiter(s) IV Continuous <Continuous>  ibuprofen  Tablet 600milliGRAM(s) Oral every 6 hours  diphtheria/tetanus/pertussis (acellular) Vaccine (ADAcel) 0.5milliLiter(s) IntraMuscular once  ferrous    sulfate 325milliGRAM(s) Oral daily  prenatal multivitamin 1Tablet(s) Oral daily  heparin  Injectable 58163Boko(s) SubCutaneous every 12 hours    MEDICATIONS  (PRN):  simethicone 80milliGRAM(s) Chew every 4 hours PRN Gas  diphenhydrAMINE   Capsule 25milliGRAM(s) Oral every 6 hours PRN Itching  glycerin Suppository - Adult 1Suppository(s) Rectal at bedtime PRN Constipation  docusate sodium 100milliGRAM(s) Oral two times a day PRN Stool Softening  lanolin Ointment 1Application(s) Topical every 3 hours PRN Sore Nipples  oxyCODONE IR 5milliGRAM(s) Oral every 3 hours PRN Mild Pain  oxyCODONE IR 10milliGRAM(s) Oral every 3 hours PRN Moderate Pain  HYDROmorphone  Injectable 1milliGRAM(s) IV Push every 3 hours PRN Severe Pain  ondansetron Injectable 4milliGRAM(s) IV Push every 6 hours PRN Nausea  butorphanol Injectable 0.125milliGRAM(s) IV Push every 6 hours PRN Pruritus

## 2017-06-21 NOTE — PROGRESS NOTE ADULT - SUBJECTIVE AND OBJECTIVE BOX
S: Patient reports of inadequate pain control to RN    O: Vital Signs Last 24 Hrs  T(C): 37.1, Max: 37.1 (06-21 @ 01:37)  T(F): 98.8, Max: 98.8 (06-21 @ 01:37)  HR: 99 (73 - 110)  BP: 113/53 (109/65 - 147/83)  BP(mean): 86 (69 - 98)  RR: 18 (14 - 27)  SpO2: 95% (95% - 100%)    Labs:                        10.3   10.94 )-----------( 182      ( 20 Jun 2017 15:38 )             31.7     Urine output during night shift: 1450 cc  Urine output at this time: 400cc      A: 36y PPD#1 s/p C/S complicated by EBL 2500cc    Plan: Continue routine postpartum care.  - patient cleared to start Toradol given VSS and urine output is adequate, patient meets both criteria.

## 2017-06-21 NOTE — LACTATION INITIAL EVALUATION - INTERVENTION OUTCOME
verbalizes understanding/demonstrates understanding of teaching/reviewed wet and soiled diaper log, feeding cues, feeding on demand;  encouraged to ask for assistance with breastfeeding as needed/good return demonstration

## 2017-06-21 NOTE — PROGRESS NOTE ADULT - SUBJECTIVE AND OBJECTIVE BOX
Postop Day  __1_ s/p   C- Section    THERAPY:  [X] Spinal morphine   [  ] Epidural morphine   [  ] IV PCA Hydromorphone 1 mg/ml      Sedation Score:	  [ X ] Alert	    [  ] Drowsy        [  ] Arousable	[  ] Asleep	[  ] Unresponsive    Side Effects:	  [ X ] None	     [  ] Nausea        [  ] Pruritus        [  ] Weakness   [  ] Numbness        ASSESSMENT/ PLAN   [ X ] Discontinue         [  ] Continue  [ x ]Documentation and Verification of current medications     Comments: May use oral opioids or adjuvant medication PRN for pain at this point.

## 2017-06-21 NOTE — PROGRESS NOTE ADULT - PROBLEM SELECTOR PLAN 1
- Continue with po analgesia  - Increase ambulation  - Continue regular diet  - d/c IV fluids  - Check CBC  - D/c Raquel

## 2017-06-21 NOTE — LACTATION INITIAL EVALUATION - LACTATION INTERVENTIONS
assisted pt. to achieve deep latch on left breast in football hold position; recommended placing rolled up receiving blanket for support to pendulous breasts;  baby noted to suck with stimulation/initiate skin to skin

## 2017-06-21 NOTE — PROGRESS NOTE ADULT - SUBJECTIVE AND OBJECTIVE BOX
ANESTHESIA POSTOP CHECK    36y Female POSTOP DAY 1 S/P     [ X] NO APPARENT ANESTHESIA COMPLICATIONS      Comments:

## 2017-06-21 NOTE — PROGRESS NOTE ADULT - ASSESSMENT
37y/o  POD#1 from rLTCS+BTL in stable condition. Current issues include EBL 2500 c/b adhesions, s/p 2uPRBCs, s/p Ancef x2 35y/o  POD#1 from rLTCS+BTL for hx of myomectomy in stable condition. Current issues include EBL 2500 c/b adhesions, s/p 2uPRBCs, s/p Ancef x2

## 2017-06-22 RX ADMIN — OXYCODONE HYDROCHLORIDE 5 MILLIGRAM(S): 5 TABLET ORAL at 05:00

## 2017-06-22 RX ADMIN — Medication 600 MILLIGRAM(S): at 10:38

## 2017-06-22 RX ADMIN — SIMETHICONE 80 MILLIGRAM(S): 80 TABLET, CHEWABLE ORAL at 04:03

## 2017-06-22 RX ADMIN — Medication 975 MILLIGRAM(S): at 11:07

## 2017-06-22 RX ADMIN — OXYCODONE HYDROCHLORIDE 5 MILLIGRAM(S): 5 TABLET ORAL at 12:45

## 2017-06-22 RX ADMIN — OXYCODONE HYDROCHLORIDE 5 MILLIGRAM(S): 5 TABLET ORAL at 16:15

## 2017-06-22 RX ADMIN — OXYCODONE HYDROCHLORIDE 5 MILLIGRAM(S): 5 TABLET ORAL at 23:37

## 2017-06-22 RX ADMIN — Medication 600 MILLIGRAM(S): at 11:07

## 2017-06-22 RX ADMIN — HEPARIN SODIUM 10000 UNIT(S): 5000 INJECTION INTRAVENOUS; SUBCUTANEOUS at 16:11

## 2017-06-22 RX ADMIN — OXYCODONE HYDROCHLORIDE 5 MILLIGRAM(S): 5 TABLET ORAL at 10:37

## 2017-06-22 RX ADMIN — Medication 975 MILLIGRAM(S): at 10:37

## 2017-06-22 RX ADMIN — Medication 975 MILLIGRAM(S): at 17:10

## 2017-06-22 RX ADMIN — Medication 1 TABLET(S): at 12:18

## 2017-06-22 RX ADMIN — SIMETHICONE 80 MILLIGRAM(S): 80 TABLET, CHEWABLE ORAL at 23:37

## 2017-06-22 RX ADMIN — OXYCODONE HYDROCHLORIDE 5 MILLIGRAM(S): 5 TABLET ORAL at 04:03

## 2017-06-22 RX ADMIN — Medication 975 MILLIGRAM(S): at 16:15

## 2017-06-22 RX ADMIN — Medication 600 MILLIGRAM(S): at 17:10

## 2017-06-22 RX ADMIN — Medication 325 MILLIGRAM(S): at 16:17

## 2017-06-22 RX ADMIN — Medication 600 MILLIGRAM(S): at 16:14

## 2017-06-22 RX ADMIN — Medication 325 MILLIGRAM(S): at 10:37

## 2017-06-22 RX ADMIN — OXYCODONE HYDROCHLORIDE 5 MILLIGRAM(S): 5 TABLET ORAL at 11:07

## 2017-06-22 RX ADMIN — HEPARIN SODIUM 10000 UNIT(S): 5000 INJECTION INTRAVENOUS; SUBCUTANEOUS at 04:12

## 2017-06-22 RX ADMIN — Medication 500 MILLIGRAM(S): at 12:18

## 2017-06-22 RX ADMIN — Medication 975 MILLIGRAM(S): at 23:37

## 2017-06-22 RX ADMIN — Medication 325 MILLIGRAM(S): at 12:18

## 2017-06-22 RX ADMIN — OXYCODONE HYDROCHLORIDE 5 MILLIGRAM(S): 5 TABLET ORAL at 17:10

## 2017-06-22 RX ADMIN — Medication 100 MILLIGRAM(S): at 18:33

## 2017-06-22 RX ADMIN — Medication 600 MILLIGRAM(S): at 23:37

## 2017-06-22 RX ADMIN — OXYCODONE HYDROCHLORIDE 5 MILLIGRAM(S): 5 TABLET ORAL at 12:18

## 2017-06-22 NOTE — PROGRESS NOTE ADULT - SUBJECTIVE AND OBJECTIVE BOX
OB Postpartum Note: Repeat  Delivery, POD#2    S: 35yo  POD#2 s/p rLTCS. Her pain is well controlled. She is tolerating a regular diet and passing flatus. Voiding spontaneously. Denies N/V. Denies CP/SOB/lightheadedness/dizziness.     O:   Vitals:  Vital Signs Last 24 Hrs  T(C): 36.6, Max: 37.1 (- @ 10:21)  T(F): 97.9, Max: 98.8 (- @ 10:21)  HR: 100 (100 - 103)  BP: 113/76 (109/60 - 113/76)  BP(mean): --  RR: 17 (17 - 18)  SpO2: 98% (98% - 99%)    MEDICATIONS  (STANDING):  acetaminophen   Tablet. 975milliGRAM(s) Oral every 6 hours  ibuprofen  Tablet 600milliGRAM(s) Oral every 6 hours  diphtheria/tetanus/pertussis (acellular) Vaccine (ADAcel) 0.5milliLiter(s) IntraMuscular once  prenatal multivitamin 1Tablet(s) Oral daily  heparin  Injectable 26431Txki(s) SubCutaneous every 12 hours  oxyCODONE IR 5milliGRAM(s) Oral every 3 hours  ferrous    sulfate 325milliGRAM(s) Oral three times a day with meals  docusate sodium 100milliGRAM(s) Oral two times a day  ascorbic acid 500milliGRAM(s) Oral daily    MEDICATIONS  (PRN):  simethicone 80milliGRAM(s) Chew every 4 hours PRN Gas  diphenhydrAMINE   Capsule 25milliGRAM(s) Oral every 6 hours PRN Itching  glycerin Suppository - Adult 1Suppository(s) Rectal at bedtime PRN Constipation  lanolin Ointment 1Application(s) Topical every 3 hours PRN Sore Nipples  oxyCODONE IR 5milliGRAM(s) Oral every 4 hours PRN Severe Pain (7 - 10)      Labs:  Blood type: A Positive  Rubella IgG: RPR: Negative                          8.9<L>   10.27 >-----------< 218    (  @ 14:14 )             27.0<L>                        8.6<L>   9.67 >-----------< 201    (  @ 06:00 )             27.1<L>                        10.3<L>   10.94<H> >-----------< 182    (  @ 15:38 )             31.7<L>                  PE:  General: NAD  Abdomen: mildly distended,appropriately tender, incision c/d/i.  Extremities: SCDs in place, no erythema

## 2017-06-22 NOTE — PROGRESS NOTE ADULT - PROBLEM SELECTOR PLAN 1
- Continue regular diet.  - Increase ambulation.  - Continue motrin, tylenol, oxycodone PRN for pain control.  ZANDER Villalba, PGY2

## 2017-06-22 NOTE — PROGRESS NOTE ADULT - SUBJECTIVE AND OBJECTIVE BOX
Postpartum Note,  Section  She is a  36y woman who is now post-operative day: 2 s/p 2 units of PRBC for anemia of blood loss     Subjective:  The patient feels well.  She is ambulating.   She is tolerating regular diet.  She denies nausea and vomiting.  She is voiding.  Her pain is controlled.  She reports normal postpartum bleeding.  She is breastfeeding.  She is formula feeding.    Physical exam:    Vital Signs Last 24 Hrs  T(C): 36.9, Max: 36.9 (- @ 14:03)  T(F): 98.4, Max: 98.4 (- @ 14:03)  HR: 73 (73 - 100)  BP: 111/63 (109/60 - 113/76)  BP(mean): --  RR: 18 (17 - 18)  SpO2: 98% (98% - 98%)    Gen: NAD  Breast: Soft, nontender, not engorged.  Abdomen: Soft, nontender, no distension , firm uterine fundus at umbilicus.  Incision: Clean, dry, and intact  Pelvic: Normal lochia noted  Ext: No calf tenderness    LABS:                        8.9    10.27 )-----------( 218      ( 2017 14:14 )             27.0       Rubella status:     Allergies    No Known Allergies    Intolerances      MEDICATIONS  (STANDING):  acetaminophen   Tablet. 975milliGRAM(s) Oral every 6 hours  ibuprofen  Tablet 600milliGRAM(s) Oral every 6 hours  diphtheria/tetanus/pertussis (acellular) Vaccine (ADAcel) 0.5milliLiter(s) IntraMuscular once  prenatal multivitamin 1Tablet(s) Oral daily  heparin  Injectable 03039Ljmx(s) SubCutaneous every 12 hours  oxyCODONE IR 5milliGRAM(s) Oral every 3 hours  ferrous    sulfate 325milliGRAM(s) Oral three times a day with meals  docusate sodium 100milliGRAM(s) Oral two times a day  ascorbic acid 500milliGRAM(s) Oral daily    MEDICATIONS  (PRN):  simethicone 80milliGRAM(s) Chew every 4 hours PRN Gas  diphenhydrAMINE   Capsule 25milliGRAM(s) Oral every 6 hours PRN Itching  glycerin Suppository - Adult 1Suppository(s) Rectal at bedtime PRN Constipation  lanolin Ointment 1Application(s) Topical every 3 hours PRN Sore Nipples  oxyCODONE IR 5milliGRAM(s) Oral every 4 hours PRN Severe Pain (7 - 10)        Assessment and Plan  POD # 2 s/p  section  Doing well.  Encourage ambulation.

## 2017-06-23 VITALS
OXYGEN SATURATION: 98 % | TEMPERATURE: 98 F | HEART RATE: 85 BPM | SYSTOLIC BLOOD PRESSURE: 105 MMHG | DIASTOLIC BLOOD PRESSURE: 62 MMHG | RESPIRATION RATE: 18 BRPM

## 2017-06-23 RX ORDER — ACETAMINOPHEN 500 MG
3 TABLET ORAL
Qty: 0 | Refills: 0 | COMMUNITY
Start: 2017-06-23

## 2017-06-23 RX ORDER — IBUPROFEN 200 MG
1 TABLET ORAL
Qty: 0 | Refills: 0 | COMMUNITY
Start: 2017-06-23

## 2017-06-23 RX ADMIN — Medication 600 MILLIGRAM(S): at 00:37

## 2017-06-23 RX ADMIN — Medication 600 MILLIGRAM(S): at 07:21

## 2017-06-23 RX ADMIN — OXYCODONE HYDROCHLORIDE 5 MILLIGRAM(S): 5 TABLET ORAL at 06:21

## 2017-06-23 RX ADMIN — Medication 975 MILLIGRAM(S): at 06:21

## 2017-06-23 RX ADMIN — Medication 975 MILLIGRAM(S): at 00:37

## 2017-06-23 RX ADMIN — Medication 100 MILLIGRAM(S): at 06:21

## 2017-06-23 RX ADMIN — SIMETHICONE 80 MILLIGRAM(S): 80 TABLET, CHEWABLE ORAL at 06:21

## 2017-06-23 RX ADMIN — Medication 975 MILLIGRAM(S): at 07:21

## 2017-06-23 RX ADMIN — Medication 600 MILLIGRAM(S): at 06:22

## 2017-06-23 RX ADMIN — Medication 325 MILLIGRAM(S): at 15:30

## 2017-06-23 RX ADMIN — HEPARIN SODIUM 10000 UNIT(S): 5000 INJECTION INTRAVENOUS; SUBCUTANEOUS at 05:58

## 2017-06-23 RX ADMIN — OXYCODONE HYDROCHLORIDE 5 MILLIGRAM(S): 5 TABLET ORAL at 00:37

## 2017-06-23 NOTE — DISCHARGE NOTE OB - CARE PLAN
Principal Discharge DX:	 delivery delivered  Goal:	recovery  Instructions for follow-up, activity and diet:	return visit x 2 weeks, pelvic rest, regular diet

## 2017-06-23 NOTE — PROGRESS NOTE ADULT - PROBLEM SELECTOR PLAN 1
- Continue regular diet.  - Increase ambulation.  - Continue motrin, tylenol, oxycodone PRN for pain control.  - Discharge planning.    Gurdeep Villalba PGY-2

## 2017-06-23 NOTE — DISCHARGE NOTE OB - CARE PROVIDER_API CALL
Jenelle Tristan (MD), Obstetrics and Gynecology  [address] [place] [address]  Phone: [phone]  Fax: [phone]

## 2017-06-23 NOTE — DISCHARGE NOTE OB - MEDICATION SUMMARY - MEDICATIONS TO TAKE
I will START or STAY ON the medications listed below when I get home from the hospital:    ibuprofen 600 mg oral tablet  -- 1 tab(s) by mouth every 6 hours, As Needed  -- Indication: For as needed for pain    acetaminophen 325 mg oral tablet  -- 3 tab(s) by mouth every 6 hours, As Needed  -- Indication: For as needed for pain    Prenatal Multivitamins with Folic Acid 1 mg oral tablet  -- 1 tab(s) by mouth once a day  -- Indication: For  delivery delivered

## 2017-06-23 NOTE — DISCHARGE NOTE OB - MEDICATION SUMMARY - MEDICATIONS TO STOP TAKING
I will STOP taking the medications listed below when I get home from the hospital:    metoclopramide 10 mg oral tablet  -- 1 tab(s) by mouth every 6 to 8 hours  -- It is very important that you take or use this exactly as directed.  Do not skip doses or discontinue unless directed by your doctor.  May cause drowsiness.  Alcohol may intensify this effect.  Use care when operating dangerous machinery.  Take medication on an empty stomach 1 hour before or 2 to 3 hours after a meal unless otherwise directed by your doctor.

## 2017-06-23 NOTE — PROGRESS NOTE ADULT - ATTENDING COMMENTS
Patient seen and agree with above. Stable for discharge.  SYDNIE Montes Patient seen and agree with above. +edema, no HA, N/V,scotomata, RUQ pain.    vss  Stable for discharge.  Dw patient danger signs preeclampsia  KEY. Jyoti

## 2017-06-23 NOTE — DISCHARGE NOTE OB - PATIENT PORTAL LINK FT
“You can access the [employer] Patient Portal, offered by [facility], by registering with the following website: [email or web address]”

## 2017-06-23 NOTE — PROGRESS NOTE ADULT - SUBJECTIVE AND OBJECTIVE BOX
OB Postpartum Note: Repeat  Delivery, POD#3    S: 35yo  POD#3 s/p rLTCS. Her pain is well controlled. She is tolerating a regular diet and passing flatus. Voiding spontaneously. Denies N/V. Denies CP/SOB/lightheadedness/dizziness.     O:   Vitals:  Vital Signs Last 24 Hrs  T(C): 36.6, Max: 37 (- @ 22:22)  T(F): 97.9, Max: 98.6 (- @ 22:22)  HR: 85 (73 - 99)  BP: 105/62 (105/62 - 122/62)  BP(mean): --  RR: 18 (18 - 19)  SpO2: 98% (98% - 100%)    MEDICATIONS  (STANDING):  acetaminophen   Tablet. 975milliGRAM(s) Oral every 6 hours  ibuprofen  Tablet 600milliGRAM(s) Oral every 6 hours  diphtheria/tetanus/pertussis (acellular) Vaccine (ADAcel) 0.5milliLiter(s) IntraMuscular once  prenatal multivitamin 1Tablet(s) Oral daily  heparin  Injectable 79469Lwcr(s) SubCutaneous every 12 hours  oxyCODONE IR 5milliGRAM(s) Oral every 3 hours  ferrous    sulfate 325milliGRAM(s) Oral three times a day with meals  docusate sodium 100milliGRAM(s) Oral two times a day  ascorbic acid 500milliGRAM(s) Oral daily    MEDICATIONS  (PRN):  simethicone 80milliGRAM(s) Chew every 4 hours PRN Gas  diphenhydrAMINE   Capsule 25milliGRAM(s) Oral every 6 hours PRN Itching  glycerin Suppository - Adult 1Suppository(s) Rectal at bedtime PRN Constipation  lanolin Ointment 1Application(s) Topical every 3 hours PRN Sore Nipples  oxyCODONE IR 5milliGRAM(s) Oral every 4 hours PRN Severe Pain (7 - 10)      Labs:  Blood type: A Positive  Rubella IgG: RPR: Negative                          8.9<L>   10.27 >-----------< 218    (  @ 14:14 )             27.0<L>                        8.6<L>   9.67 >-----------< 201    (  @ 06:00 )             27.1<L>                        10.3<L>   10.94<H> >-----------< 182    (  @ 15:38 )             31.7<L>                  PE:  General: NAD  Abdomen: mildly distended,appropriately tender, incision c/d/i.  Extremities: SCDs in place, no erythema

## 2017-06-27 LAB — SURGICAL PATHOLOGY STUDY: SIGNIFICANT CHANGE UP

## 2017-07-06 DIAGNOSIS — O99.013 ANEMIA COMPLICATING PREGNANCY, THIRD TRIMESTER: ICD-10-CM

## 2017-07-06 DIAGNOSIS — O09.293 SUPERVISION OF PREGNANCY WITH OTHER POOR REPRODUCTIVE OR OBSTETRIC HISTORY, THIRD TRIMESTER: ICD-10-CM

## 2017-07-06 DIAGNOSIS — O24.414 GESTATIONAL DIABETES MELLITUS IN PREGNANCY, INSULIN CONTROLLED: ICD-10-CM

## 2021-02-02 ENCOUNTER — RESULT REVIEW (OUTPATIENT)
Age: 41
End: 2021-02-02

## 2021-02-09 NOTE — LACTATION INITIAL EVALUATION - LATCH: SCORE INFANT
Patient Education        Child's Well Visit, 14 to 15 Months: Care Instructions  Your Care Instructions     Your child is exploring his or her world and may experience many emotions. When parents respond to emotional needs in a loving, consistent way, their children develop confidence and feel more secure. At 14 to 15 months, your child may be able to say a few words, understand simple commands, and let you know what he or she wants by pulling, pointing, or grunting. Your child may drink from a cup and point to parts of his or her body. Your child may walk well and climb stairs. Follow-up care is a key part of your child's treatment and safety. Be sure to make and go to all appointments, and call your doctor if your child is having problems. It's also a good idea to know your child's test results and keep a list of the medicines your child takes. How can you care for your child at home? Safety  · Make sure your child cannot get burned. Keep hot pots, curling irons, irons, and coffee cups out of his or her reach. Put plastic plugs in all electrical sockets. Put in smoke detectors and check the batteries regularly. · For every ride in a car, secure your child into a properly installed car seat that meets all current safety standards. For questions about car seats, call the [employer] at 0-247.540.6490. · Watch your child at all times when he or she is near water, including pools, hot tubs, buckets, bathtubs, and toilets. · Keep cleaning products and medicines in locked cabinets out of your child's reach. Keep the number for Poison Control (9-197.982.5990) near your phone. · Tell your doctor if your child spends a lot of time in a house built before 1978. The paint could have lead in it, which can be harmful. Discipline  · Be patient and be consistent, but do not say \"no\" all the time or have too many rules. It will only confuse your child. · Teach your child how to use words to ask for things. · Set a good example. Do not get angry or yell in front of your child. · If your child is being demanding, try to change his or her attention to something else. Or you can move to a different room so your child has some space to calm down. · If your child does not want to do something, do not get upset. Children often say no at this age. If your child does not want to do something that really needs to be done, like going to day care, gently pick your child up and take him or her to day care. · Be loving, understanding, and consistent to help your child through this part of development. Feeding  · Offer a variety of healthy foods each day, including fruits, well-cooked vegetables, low-sugar cereal, yogurt, whole-grain breads and crackers, lean meat, fish, and tofu. Kids need to eat at least every 3 or 4 hours. · Do not give your child foods that may cause choking, such as nuts, whole grapes, hard or sticky candy, or popcorn. · Give your child healthy snacks. Even if your child does not seem to like them at first, keep trying. Buy snack foods made from wheat, corn, rice, oats, or other grains, such as breads, cereals, tortillas, noodles, crackers, and muffins. Immunizations  · Make sure your baby gets the recommended childhood vaccines. They will help keep your baby healthy and prevent the spread of disease. When should you call for help? Watch closely for changes in your child's health, and be sure to contact your doctor if:    · You are concerned that your child is not growing or developing normally.     · You are worried about your child's behavior.     · You need more information about how to care for your child, or you have questions or concerns. Where can you learn more? 7

## 2022-02-04 ENCOUNTER — RESULT REVIEW (OUTPATIENT)
Age: 42
End: 2022-02-04

## 2022-09-12 ENCOUNTER — APPOINTMENT (OUTPATIENT)
Dept: ORTHOPEDIC SURGERY | Facility: CLINIC | Age: 42
End: 2022-09-12

## 2022-09-12 VITALS — WEIGHT: 270 LBS | BODY MASS INDEX: 40.92 KG/M2 | HEIGHT: 68 IN

## 2022-09-12 DIAGNOSIS — E11.9 TYPE 2 DIABETES MELLITUS W/OUT COMPLICATIONS: ICD-10-CM

## 2022-09-12 PROCEDURE — 73610 X-RAY EXAM OF ANKLE: CPT | Mod: LT

## 2022-09-12 PROCEDURE — 73630 X-RAY EXAM OF FOOT: CPT | Mod: LT

## 2022-09-12 PROCEDURE — 99204 OFFICE O/P NEW MOD 45 MIN: CPT

## 2022-09-12 RX ORDER — MELOXICAM 15 MG/1
15 TABLET ORAL DAILY
Qty: 30 | Refills: 0 | Status: ACTIVE | COMMUNITY
Start: 2022-09-12 | End: 1900-01-01

## 2022-09-12 NOTE — HISTORY OF PRESENT ILLNESS
[Sudden] : sudden [10] : 10 [3] : 3 [Burning] : burning [Dull/Aching] : dull/aching [Localized] : localized [Throbbing] : throbbing [Constant] : constant [Household chores] : household chores [Leisure] : leisure [Work] : work [Sleep] : sleep [Rest] : rest [Meds] : meds [Ice] : ice [Walking] : walking [Exercising] : exercising [Stairs] : stairs [Lying in bed] : lying in bed [Full time] : Work status: full time [de-identified] : 09/12/2022 In June 2022 patient states she was jumping rope on asphalt and she felt pain in her left achilles.  She did have some pain in the area of her Achilles tendon prior to this incident.  Since this incident she has had pain as well as sensitivity in the back of her heel.  She does find herself limping with prolonged walking and standing.  The pain is localized to the back of her heel she denies any calf pain or any shortness of breath.  She denies any numbness or tingling or any fever or chills. [] : no [FreeTextEntry1] : Left Achilles [FreeTextEntry3] : June 2022 [FreeTextEntry9] : Salt soak, elevation

## 2022-09-12 NOTE — IMAGING
[de-identified] : Constitutional: General Appearance: healthy-appearing, NAD, and normal body habitus.\par Psychiatric: Orientation: oriented to time, place, and person. Mood and Affect: normal mood and affect and active and alert.\par \par Skin: Right Lower Extremity: normal. Left Lower Extremity: normal. Inspection and palpation: no rash, lesions, or induration.\par \par Inspection: Hindfoot alignment is in slight valgus. There is mild moderate swelling over the distal distal Achilles tendon and the Achilles insertion. There is no erythema or induration. Able to perform a single-leg heel rise.\par \par Palpation: There is no anterior ankle joint line tenderness. Diffusely tender over the insertion and distal Achilles tendon and over the posterior heel with localized swelling. There is no tenderness over the insertion of the Achilles tendon. There is no palpable defect within the Achilles tendon. There is a down-going Greer test. The resting tension of the Achilles tendon is equal to the contralateral extremity. The calf is soft and nontender. There is no pain over the proximal mid shaft or distal tibia or fibula. The leg\par compartments are soft and nontender. Nontender over the medial or lateral malleolus or over the course of the peroneal or posterior tibial tendons. No instability or laxity on examination of the ankle or subtalar joints. No tenderness over the sinus tarsi. There is no tenderness over the heel or over the plantar fascia.\par On examination of the foot: There is no tenderness to the transverse tarsal joints, TMT joints or over the Lisfranc joint. There is no tenderness over the navicular, cuboid, cuneiforms or metatarsal shafts. No pain over the webspaces. Full painless range of motion through the MTP joints. No pain on examination of the toes.\par \par ROM: Limited dorsiflexion of 5 degrees, plantar flexion 25 degrees, inversion 15 degrees, eversion 10 degrees. There is no pain or discomfort on active range of motion.\par \par Muscle strength: 5/5 strength with resisted dorsiflexion, plantar flexion, inversion and eversion with pain on plantar flexion\par \par Stability: No instability or laxity on varus and valgus stress. Negative anterior drawer test.\par \par Vascular Exam: Arterial Pulses Right and Left: posterior tibialis normal and dorsalis pedis normal. Edema Right and Left: no edema. Varicosities Right and Left: no varicosities and capillary refill test normal.\par \par Neurological System: Sensation on the Left: normal at the lateral plantar nerve, the medial plantar nerve, the superficial peroneal nerve, the deep peroneal nerve, the sural nerve, and the saphenous nerve and normal distal extremities. Sensation: grossly intact.\par \par Radiographs: X-rays done today in the office were 3 views of the ankle and foot weight-bearing with no limitations to today's studies which reveal no acute fractures or dislocations. The ankle mortise and syndesmosis are reduced and well aligned. \par There is a posterior calcaneal osteophyte noted, with a posterior superior Juan's deformity, with significant intrasubstance calcification within the distal Achilles tendon\par

## 2022-09-12 NOTE — ASSESSMENT
[FreeTextEntry1] : After their examination today in the office and review of their radiographs they have developed moderate to severe Achilles tendinitis/tendinosis. Since they are quite painful and unable to bear weight or walk comfortably on the extremity without a significant limp or limitation I would recommend protection and immobilization in a Cam Walker boot in order to relieve all stress and repetitive motion from the inflamed painful and degenerated Achilles tendon. The Cam Walker boot was manipulated to fit them today in the office and they were much more comfortable walking in the Cam Walker boot, and were able to walk in a Cam Walker boot safely. A neutral heel wedge was placed into the foot in order to elevate the heel to decrease the tension and stress seen by the inflamed and painful Achilles tendon. I would recommend wearing the Cam Walker boot for the next 3-4 weeks. I would like them to come out of the Cam Walker boot for gentle range of motion exercises as well as ice therapy throughout the day at least 3-4 times per day. They can also use an oral anti-inflammatory if tolerated over the 2-3 weeks to decrease the localized pain and inflammation. I would like to see him back in 3-4 weeks for repeat clinical examination, and hopefully if there pain and swelling have improved we will then transition them out of the Cam Walker and begin stretching exercises and continue with ice therapy and begin physical therapy

## 2022-10-03 ENCOUNTER — APPOINTMENT (OUTPATIENT)
Dept: ORTHOPEDIC SURGERY | Facility: CLINIC | Age: 42
End: 2022-10-03

## 2022-10-03 VITALS — WEIGHT: 270 LBS | BODY MASS INDEX: 40.92 KG/M2 | HEIGHT: 68 IN

## 2022-10-03 DIAGNOSIS — M25.572 PAIN IN LEFT ANKLE AND JOINTS OF LEFT FOOT: ICD-10-CM

## 2022-10-03 PROCEDURE — 99213 OFFICE O/P EST LOW 20 MIN: CPT

## 2022-10-03 NOTE — HISTORY OF PRESENT ILLNESS
[2] : 2 [0] : 0 [Dull/Aching] : dull/aching [Rest] : rest [de-identified] : In June 2022 patient states she was jumping rope on asphalt and she felt pain in her left achilles.  She did have some pain in the area of her Achilles tendon prior to this incident.  Since this incident she has had pain as well as sensitivity in the back of her heel.  She does find herself limping with prolonged walking and standing.  The pain is localized to the back of her heel she denies any calf pain or any shortness of breath.  She denies any numbness or tingling or any fever or chills. [FreeTextEntry1] : left achilles  [FreeTextEntry5] : JED BARTON  is a 42 year female who is here today to follow up on left achilles pain, her pain has improved.  [de-identified] : motion  [de-identified] : meds

## 2022-10-03 NOTE — IMAGING
[de-identified] : Constitutional: General Appearance: healthy-appearing, NAD, and normal body habitus.\par Psychiatric: Orientation: oriented to time, place, and person. Mood and Affect: normal mood and affect and active and alert.\par \par Skin: Right Lower Extremity: normal. Left Lower Extremity: normal. Inspection and palpation: no rash, lesions, or induration.\par \par Inspection: Hindfoot alignment is in slight valgus. There is mild moderate swelling over the distal distal Achilles tendon and the Achilles insertion. There is no erythema or induration. Able to perform a single-leg heel rise.\par \par Palpation: There is no anterior ankle joint line tenderness.  Normally tender over the insertion and distal Achilles tendon and over the posterior heel with localized swelling. There is no tenderness over the insertion of the Achilles tendon. There is no palpable defect within the Achilles tendon. There is a down-going Greer test. The resting tension of the Achilles tendon is equal to the contralateral extremity. The calf is soft and nontender. There is no pain over the proximal mid shaft or distal tibia or fibula. The leg\par compartments are soft and nontender. Nontender over the medial or lateral malleolus or over the course of the peroneal or posterior tibial tendons. No instability or laxity on examination of the ankle or subtalar joints. No tenderness over the sinus tarsi. There is no tenderness over the heel or over the plantar fascia.\par On examination of the foot: There is no tenderness to the transverse tarsal joints, TMT joints or over the Lisfranc joint. There is no tenderness over the navicular, cuboid, cuneiforms or metatarsal shafts. No pain over the webspaces. Full painless range of motion through the MTP joints. No pain on examination of the toes.\par \par ROM: Limited dorsiflexion of 5 degrees, plantar flexion 25 degrees, inversion 15 degrees, eversion 10 degrees. There is no pain or discomfort on active range of motion.\par \par Muscle strength: 5/5 strength with resisted dorsiflexion, plantar flexion, inversion and eversion with pain on plantar flexion\par \par Stability: No instability or laxity on varus and valgus stress. Negative anterior drawer test.\par \par Vascular Exam: Arterial Pulses Right and Left: posterior tibialis normal and dorsalis pedis normal. Edema Right and Left: no edema. Varicosities Right and Left: no varicosities and capillary refill test normal.\par \par Neurological System: Sensation on the Left: normal at the lateral plantar nerve, the medial plantar nerve, the superficial peroneal nerve, the deep peroneal nerve, the sural nerve, and the saphenous nerve and normal distal extremities. Sensation: grossly intact.\par \par

## 2022-10-03 NOTE — ASSESSMENT
[FreeTextEntry1] : After her examination today in the office and review of her radiographs I do think she is doing very well and she feels that her pain has now nearly completely resolved with a period of immobilization in the cam walker boot I would like to transition her out of the cam walker boot into a good supportive shoe or sneaker with a neutral heel wedge.  I would also like her to start physical therapy to help her with stretching as well as local modalities of her Achilles tendon in order to prevent recurrence of pain and swelling of the Achilles tendon.  We discussed that it is important for her to start stretching slowly and gradually twice a day which was demonstrated and taught to her today in the office the Achilles tendon in order to better condition the Achilles tendon and to decrease the repetitive stress to the Achilles tendon.  She can use local heat and ice therapy and anti-inflammatory as needed.  I would like her to refrain from any running or jumping type of activities and I would like her to to follow-up in 4 weeks for repeat clinical and x-ray examination or earlier if she notices any increasing pain or has any concerns

## 2022-10-31 ENCOUNTER — APPOINTMENT (OUTPATIENT)
Dept: ORTHOPEDIC SURGERY | Facility: CLINIC | Age: 42
End: 2022-10-31

## 2022-10-31 VITALS — WEIGHT: 270 LBS | HEIGHT: 68 IN | BODY MASS INDEX: 40.92 KG/M2

## 2022-10-31 DIAGNOSIS — M76.62 ACHILLES TENDINITIS, LEFT LEG: ICD-10-CM

## 2022-10-31 PROCEDURE — 99213 OFFICE O/P EST LOW 20 MIN: CPT

## 2022-10-31 NOTE — ASSESSMENT
[FreeTextEntry1] : After her examination today in the office and review of her previous radiographs she continues to have pain which she feels is only minimally improved since a period of immobilization in her cam walker boot as well as with physical therapy.  At this point I would like to order an MRI of the ankle and hindfoot in order to evaluate the distal Achilles tendon as well as to evaluate for significant tendinosis or tearing as she feels she is frustrated with the amount of pain and feels that surgery may be her next step.  I would like to see her back after the MRI is completed for review of her MRI findings.

## 2022-10-31 NOTE — HISTORY OF PRESENT ILLNESS
[4] : 4 [3] : 3 [Dull/Aching] : dull/aching [Localized] : localized [Constant] : constant [Meds] : meds [Standing] : standing [Walking] : walking [de-identified] : Is here for follow-up of her left posterior heel pain and distal Achilles tendon pain.  She felt significant relief when she came out of the cam walker boot however once she has started to walk in regular shoes as well as attend physical therapy she has noticed a return of her pain.  She feels only minimal improvements in her heel as well as the distal Achilles.  She denies any proximal calf pain she does not notice any redness or warmth of the ankle or heel [FreeTextEntry5] : 10/31/2022 Pt states Lt Achilles is not improving. Pt states having constant dull pain. Pt states taking Motrin and Ice but it relief temporary.

## 2022-10-31 NOTE — IMAGING
[de-identified] : Constitutional: General Appearance: healthy-appearing, NAD, and normal body habitus.\par Psychiatric: Orientation: oriented to time, place, and person. Mood and Affect: normal mood and affect and active and alert.\par \par Skin: Right Lower Extremity: normal. Left Lower Extremity: normal. Inspection and palpation: no rash, lesions, or induration.\par \par Inspection: Hindfoot alignment is in slight valgus. There is mild moderate swelling over the distal distal Achilles tendon and the Achilles insertion. There is no erythema or induration. Able to perform a single-leg heel rise.\par \par Palpation: There is no anterior ankle joint line tenderness. Diffusely tender over the insertion and distal Achilles tendon and over the posterior heel with localized swelling. There is no tenderness over the insertion of the Achilles tendon. There is no palpable defect within the Achilles tendon. There is a down-going Greer test. The resting tension of the Achilles tendon is equal to the contralateral extremity. The calf is soft and nontender. There is no pain over the proximal mid shaft or distal tibia or fibula. The leg\par compartments are soft and nontender. Nontender over the medial or lateral malleolus or over the course of the peroneal or posterior tibial tendons. No instability or laxity on examination of the ankle or subtalar joints. No tenderness over the sinus tarsi. There is no tenderness over the heel or over the plantar fascia.\par On examination of the foot: There is no tenderness to the transverse tarsal joints, TMT joints or over the Lisfranc joint. There is no tenderness over the navicular, cuboid, cuneiforms or metatarsal shafts. No pain over the webspaces. Full painless range of motion through the MTP joints. No pain on examination of the toes.\par \par ROM: Limited dorsiflexion of 5 degrees, plantar flexion 25 degrees, inversion 15 degrees, eversion 10 degrees. There is no pain or discomfort on active range of motion.\par \par Muscle strength: 5/5 strength with resisted dorsiflexion, plantar flexion, inversion and eversion with pain on plantar flexion\par \par Stability: No instability or laxity on varus and valgus stress. Negative anterior drawer test.\par \par Vascular Exam: Arterial Pulses Right and Left: posterior tibialis normal and dorsalis pedis normal. Edema Right and Left: no edema. Varicosities Right and Left: no varicosities and capillary refill test normal.\par \par Neurological System: Sensation on the Left: normal at the lateral plantar nerve, the medial plantar nerve, the superficial peroneal nerve, the deep peroneal nerve, the sural nerve, and the saphenous nerve and normal distal extremities. Sensation: grossly intact.\par \par \par